# Patient Record
Sex: MALE | Race: WHITE | NOT HISPANIC OR LATINO | Employment: FULL TIME | URBAN - NONMETROPOLITAN AREA
[De-identification: names, ages, dates, MRNs, and addresses within clinical notes are randomized per-mention and may not be internally consistent; named-entity substitution may affect disease eponyms.]

---

## 2024-03-08 ENCOUNTER — OFFICE VISIT (OUTPATIENT)
Dept: FAMILY MEDICINE CLINIC | Facility: CLINIC | Age: 44
End: 2024-03-08
Payer: COMMERCIAL

## 2024-03-08 VITALS
BODY MASS INDEX: 24.99 KG/M2 | SYSTOLIC BLOOD PRESSURE: 120 MMHG | DIASTOLIC BLOOD PRESSURE: 80 MMHG | OXYGEN SATURATION: 99 % | HEART RATE: 87 BPM | WEIGHT: 164.9 LBS | HEIGHT: 68 IN

## 2024-03-08 DIAGNOSIS — Z11.59 NEED FOR HEPATITIS C SCREENING TEST: ICD-10-CM

## 2024-03-08 DIAGNOSIS — G89.29 CHRONIC RIGHT SHOULDER PAIN: Primary | ICD-10-CM

## 2024-03-08 DIAGNOSIS — Z13.1 SCREENING FOR DIABETES MELLITUS: ICD-10-CM

## 2024-03-08 DIAGNOSIS — Z13.220 LIPID SCREENING: ICD-10-CM

## 2024-03-08 DIAGNOSIS — Z13.29 SCREENING FOR THYROID DISORDER: ICD-10-CM

## 2024-03-08 DIAGNOSIS — M25.511 CHRONIC RIGHT SHOULDER PAIN: Primary | ICD-10-CM

## 2024-03-08 PROCEDURE — 36415 COLL VENOUS BLD VENIPUNCTURE: CPT | Performed by: PHYSICIAN ASSISTANT

## 2024-03-08 RX ORDER — PREDNISONE 10 MG/1
10 TABLET ORAL DAILY
Qty: 20 TABLET | Refills: 0 | Status: SHIPPED | OUTPATIENT
Start: 2024-03-08

## 2024-03-08 NOTE — PROGRESS NOTES
New Patient Office Visit      Date: 2024   Patient Name: Vin Aggarwal  : 1980   MRN: [id number]     Chief Complaint:    Chief Complaint   Patient presents with    Shoulder Pain       History of Present Illness: Vin Aggarwal is a 44 y.o. male who is here today to establish care.  He complains of R shoulder pain for a long time, which has been worse over the last 4 months.  He denies any known injury. He denies any numbness or tingling, but he admits radiation of pain to his elbow. He admits some weakness. He is R handed. He is restricted on internal and external rotation as well as abduction beyond shoulder height. He has been taking some Advil, which gives a little relief.     He has also not had a checkup in a while, so we will do some screening labs while he is here.    Subjective      Review of Systems:   Review of Systems   Musculoskeletal:  Positive for arthralgias. Negative for joint swelling and neck pain.       Past Medical History:   Past Medical History:   Diagnosis Date    ADHD (attention deficit hyperactivity disorder)     I have not been on meds since about  and would like to get back on them    HL (hearing loss) Long time ago    Constant ringing in my ears    Pseudotumor        Past Surgical History:   Past Surgical History:   Procedure Laterality Date    SKIN GRAFT Left     L arm, pseudoaneurysm    VASECTOMY  2006 maybe       Family History:   Family History   Problem Relation Age of Onset    Tongue cancer Mother         passed at 69    Diabetes Paternal Aunt        Social History:   Social History     Socioeconomic History    Marital status: Single   Tobacco Use    Smoking status: Former     Average packs/day: 1 pack/day for 15.0 years (15.0 ttl pk-yrs)     Types: Cigarettes     Start date:     Smokeless tobacco: Never    Tobacco comments:     I haven't smoked in 8 years   Vaping Use    Vaping status: Every Day    Substances: Nicotine   Substance and Sexual  "Activity    Alcohol use: Not Currently    Drug use: Not Currently     Types: Marijuana    Sexual activity: Yes     Partners: Female     Birth control/protection: None, Vasectomy       Medications:     Current Outpatient Medications:     predniSONE (DELTASONE) 10 MG tablet, Take 1 tablet by mouth Daily. 4 tab po qd x 2d, then 3 tab po qd x 2d, then 2 tab po qd x 2d, then 1 tab po qd x 2d, Disp: 20 tablet, Rfl: 0    Allergies:   No Known Allergies    Objective     Physical Exam:  Vital Signs:   Vitals:    03/08/24 1447   BP: 120/80   BP Location: Right arm   Patient Position: Sitting   Cuff Size: Adult   Pulse: 87   SpO2: 99%   Weight: 74.8 kg (164 lb 14.4 oz)   Height: 172.7 cm (68\")     Body mass index is 25.07 kg/m².       Physical Exam  Vitals and nursing note reviewed.   Constitutional:       General: He is not in acute distress.     Appearance: Normal appearance. He is not ill-appearing.   HENT:      Head: Normocephalic and atraumatic.   Cardiovascular:      Rate and Rhythm: Normal rate and regular rhythm.      Pulses: Normal pulses.      Heart sounds: Normal heart sounds.   Pulmonary:      Effort: Pulmonary effort is normal. No respiratory distress.      Breath sounds: Normal breath sounds.   Musculoskeletal:      Right shoulder: Tenderness present. No swelling or deformity. Decreased range of motion. Decreased strength. Normal pulse.      Left shoulder: Normal.      Right lower leg: No edema.      Left lower leg: No edema.   Skin:     General: Skin is warm and dry.   Neurological:      General: No focal deficit present.      Mental Status: He is alert and oriented to person, place, and time.      Motor: No weakness.      Coordination: Coordination normal.   Psychiatric:         Mood and Affect: Mood normal.         Behavior: Behavior normal.       Results:   PHQ-9 Total Score: 0        Assessment / Plan      Assessment/Plan:   Diagnoses and all orders for this visit:    1. Chronic right shoulder pain " (Primary)  Assessment & Plan:  We will try a round of steroids to see if his symptoms improve, and he will also complete an x-ray. I will send referral for him to see Ortho for further evaluation.  He is in agreement with this plan.    Orders:  -     XR Shoulder 2+ View Right; Future  -     Ambulatory Referral to Orthopedic Surgery  -     predniSONE (DELTASONE) 10 MG tablet; Take 1 tablet by mouth Daily. 4 tab po qd x 2d, then 3 tab po qd x 2d, then 2 tab po qd x 2d, then 1 tab po qd x 2d  Dispense: 20 tablet; Refill: 0  -     Comprehensive Metabolic Panel; Future  -     CBC Auto Differential; Future  -     Comprehensive Metabolic Panel  -     CBC Auto Differential    2. Screening for diabetes mellitus  -     Hemoglobin A1c; Future  -     Hemoglobin A1c    3. Lipid screening  -     Lipid Panel; Future  -     Lipid Panel    4. Need for hepatitis C screening test  -     HCV Antibody Rfx To Qnt PCR; Future  -     HCV Antibody Rfx To Qnt PCR  -     Interpretation:    5. Screening for thyroid disorder  -     Thyroid Cascade Profile; Future  -     Thyroid Cascade Profile  -     Reflexed Thyroxine (T4) Free, Direct, S  -     Thyroid Peroxidase (TPO) Ab         Follow Up:   Return for Annual Physical in 3-6 months.    Carisa Pollard PA-C  [facility]

## 2024-03-09 LAB
ALBUMIN SERPL-MCNC: 5.5 G/DL (ref 4.1–5.1)
ALBUMIN/GLOB SERPL: 2.1 {RATIO} (ref 1.2–2.2)
ALP SERPL-CCNC: 71 IU/L (ref 44–121)
ALT SERPL-CCNC: 20 IU/L (ref 0–44)
AST SERPL-CCNC: 23 IU/L (ref 0–40)
BASOPHILS # BLD AUTO: 0.1 X10E3/UL (ref 0–0.2)
BASOPHILS NFR BLD AUTO: 1 %
BILIRUB SERPL-MCNC: 0.6 MG/DL (ref 0–1.2)
BUN SERPL-MCNC: 14 MG/DL (ref 6–24)
BUN/CREAT SERPL: 17 (ref 9–20)
CALCIUM SERPL-MCNC: 10.1 MG/DL (ref 8.7–10.2)
CHLORIDE SERPL-SCNC: 102 MMOL/L (ref 96–106)
CHOLEST SERPL-MCNC: 210 MG/DL (ref 100–199)
CO2 SERPL-SCNC: 22 MMOL/L (ref 20–29)
CREAT SERPL-MCNC: 0.82 MG/DL (ref 0.76–1.27)
EGFRCR SERPLBLD CKD-EPI 2021: 112 ML/MIN/1.73
EOSINOPHIL # BLD AUTO: 0.1 X10E3/UL (ref 0–0.4)
EOSINOPHIL NFR BLD AUTO: 2 %
ERYTHROCYTE [DISTWIDTH] IN BLOOD BY AUTOMATED COUNT: 13.1 % (ref 11.6–15.4)
GLOBULIN SER CALC-MCNC: 2.6 G/DL (ref 1.5–4.5)
GLUCOSE SERPL-MCNC: 89 MG/DL (ref 70–99)
HBA1C MFR BLD: 5.4 % (ref 4.8–5.6)
HCT VFR BLD AUTO: 44.9 % (ref 37.5–51)
HCV AB SERPL QL IA: NORMAL
HCV IGG SERPL QL IA: NON REACTIVE
HDLC SERPL-MCNC: 62 MG/DL
HGB BLD-MCNC: 15.6 G/DL (ref 13–17.7)
IMM GRANULOCYTES # BLD AUTO: 0.1 X10E3/UL (ref 0–0.1)
IMM GRANULOCYTES NFR BLD AUTO: 1 %
INTERPRETATION: NORMAL
LDLC SERPL CALC-MCNC: 125 MG/DL (ref 0–99)
LYMPHOCYTES # BLD AUTO: 1.9 X10E3/UL (ref 0.7–3.1)
LYMPHOCYTES NFR BLD AUTO: 29 %
MCH RBC QN AUTO: 31.1 PG (ref 26.6–33)
MCHC RBC AUTO-ENTMCNC: 34.7 G/DL (ref 31.5–35.7)
MCV RBC AUTO: 90 FL (ref 79–97)
MONOCYTES # BLD AUTO: 0.5 X10E3/UL (ref 0.1–0.9)
MONOCYTES NFR BLD AUTO: 7 %
NEUTROPHILS # BLD AUTO: 3.9 X10E3/UL (ref 1.4–7)
NEUTROPHILS NFR BLD AUTO: 60 %
PLATELET # BLD AUTO: 341 X10E3/UL (ref 150–450)
POTASSIUM SERPL-SCNC: 4.1 MMOL/L (ref 3.5–5.2)
PROT SERPL-MCNC: 8.1 G/DL (ref 6–8.5)
RBC # BLD AUTO: 5.01 X10E6/UL (ref 4.14–5.8)
SODIUM SERPL-SCNC: 142 MMOL/L (ref 134–144)
T4 FREE SERPL-MCNC: 1.49 NG/DL (ref 0.82–1.77)
THYROPEROXIDASE AB SERPL-ACNC: 14 IU/ML (ref 0–34)
TRIGL SERPL-MCNC: 131 MG/DL (ref 0–149)
TSH SERPL DL<=0.005 MIU/L-ACNC: 5.26 UIU/ML (ref 0.45–4.5)
VLDLC SERPL CALC-MCNC: 23 MG/DL (ref 5–40)
WBC # BLD AUTO: 6.5 X10E3/UL (ref 3.4–10.8)

## 2024-03-24 PROBLEM — G89.29 CHRONIC RIGHT SHOULDER PAIN: Status: ACTIVE | Noted: 2024-03-24

## 2024-03-24 PROBLEM — M25.511 CHRONIC RIGHT SHOULDER PAIN: Status: ACTIVE | Noted: 2024-03-24

## 2024-03-25 NOTE — ASSESSMENT & PLAN NOTE
We will try a round of steroids to see if his symptoms improve, and he will also complete an x-ray. I will send referral for him to see Ortho for further evaluation.  He is in agreement with this plan.

## 2024-03-26 ENCOUNTER — OFFICE VISIT (OUTPATIENT)
Dept: ORTHOPEDIC SURGERY | Facility: CLINIC | Age: 44
End: 2024-03-26
Payer: COMMERCIAL

## 2024-03-26 VITALS
DIASTOLIC BLOOD PRESSURE: 78 MMHG | HEIGHT: 68 IN | WEIGHT: 164.9 LBS | BODY MASS INDEX: 24.99 KG/M2 | SYSTOLIC BLOOD PRESSURE: 122 MMHG

## 2024-03-26 DIAGNOSIS — G56.03 CARPAL TUNNEL SYNDROME, BILATERAL: ICD-10-CM

## 2024-03-26 DIAGNOSIS — M75.81 TENDINITIS OF RIGHT ROTATOR CUFF: Primary | ICD-10-CM

## 2024-03-26 RX ORDER — CELECOXIB 200 MG/1
CAPSULE ORAL
Qty: 60 CAPSULE | Refills: 1 | Status: SHIPPED | OUTPATIENT
Start: 2024-03-26

## 2024-03-26 NOTE — PROGRESS NOTES
[facility] Visit     Office Visit       Date: 03/26/2024   Patient Name: Vin Aggarwal  MRN: [id number]  YOB: 1980    Referring Physician: Carisa Pollard*     Chief Complaint:   Chief Complaint   Patient presents with    Right Shoulder - Pain     History of Present Illness: Vin Aggarwal is a 44 y.o. male who is here today for evaluation of right shoulder pain.  Symptoms ongoing for the last 15 years.  His pain is an 8/10 described as dull aching throbbing stabbing shooting.  He also has popping.  Symptoms primarily affect his ability to sleep work and move the shoulder.  He has been on oral steroids and ibuprofen.    Subjective   Review of Systems: Review of Systems   Constitutional:  Negative for chills, fever, unexpected weight gain and unexpected weight loss.   HENT:  Negative for congestion, postnasal drip and rhinorrhea.    Eyes:  Negative for blurred vision.   Respiratory:  Negative for shortness of breath.    Cardiovascular:  Negative for leg swelling.   Gastrointestinal:  Negative for abdominal pain, nausea and vomiting.   Genitourinary:  Negative for difficulty urinating.   Musculoskeletal:  Positive for arthralgias. Negative for gait problem, joint swelling and myalgias.   Skin:  Negative for skin lesions and wound.   Neurological:  Negative for dizziness, weakness, light-headedness and numbness.   Hematological:  Does not bruise/bleed easily.   Psychiatric/Behavioral:  Negative for depressed mood.         Past Medical History:   Past Medical History:   Diagnosis Date    ADHD (attention deficit hyperactivity disorder) 2014    I have not been on meds since about 2015 and would like to get back on them    HL (hearing loss) Long time ago    Constant ringing in my ears    Pseudotumor        Past Surgical History:   Past Surgical History:   Procedure Laterality Date    SKIN GRAFT Left 2014    L arm, pseudoaneurysm    VASECTOMY  " 2006 maybe       Family History:   Family History   Problem Relation Age of Onset    Tongue cancer Mother         passed at 69    Diabetes Paternal Aunt        Social History:   Social History     Socioeconomic History    Marital status: Single   Tobacco Use    Smoking status: Former     Average packs/day: 1 pack/day for 15.0 years (15.0 ttl pk-yrs)     Types: Cigarettes     Start date: 2000    Smokeless tobacco: Never    Tobacco comments:     I haven't smoked in 8 years   Vaping Use    Vaping status: Every Day    Substances: Nicotine   Substance and Sexual Activity    Alcohol use: Not Currently    Drug use: Not Currently     Types: Marijuana    Sexual activity: Yes     Partners: Female     Birth control/protection: None, Vasectomy       Medications:   Current Outpatient Medications:     predniSONE (DELTASONE) 10 MG tablet, Take 1 tablet by mouth Daily. 4 tab po qd x 2d, then 3 tab po qd x 2d, then 2 tab po qd x 2d, then 1 tab po qd x 2d, Disp: 20 tablet, Rfl: 0    celecoxib (CeleBREX) 200 MG capsule, Take 1 tablet orally 2 times per day with meals., Disp: 60 capsule, Rfl: 1    Allergies: No Known Allergies    I reviewed the patient's chief complaint, history of present illness, review of systems, past medical history, surgical history, family history, social history, medications and allergy list.     Objective    Vital Signs:   Vitals:    03/26/24 0828   BP: 122/78   Weight: 74.8 kg (164 lb 14.5 oz)   Height: 172.7 cm (67.99\")     Body mass index is 25.08 kg/m².   BMI is >= 25 and <30. (Overweight) The following options were offered after discussion;: exercise counseling/recommendations and nutrition counseling/recommendations     In this visit the patient was advised to stop smoking and was offered tobacco cessation measures and resources, including NRT and/or medication intervention. At least 5 minutes was spent on face-to-face counseling regarding smoking cessation.     Ortho Exam:  Constitutional: General " Appearance: healthy-appearing, NAD, and normal body habitus.   Psychiatric: Mood and Affect: normal mood and affect and active and alert.   Cardiovascular System: Arterial Pulses Right: radial normal. Arterial Pulses Left: radial normal.   C-Spine/Neck: Active Range of Motion: no crepitus or pain elicited on motion and flexion normal, extension normal, rotation normal, and lateral flexion normal.   Shoulders: Inspection Right: no misalignment, erythema, induration, swelling, or warmth and AC prominence normal. Inspection Left: no misalignment, erythema, induration, swelling, or warmth and AC prominence normal. Bony Palpation Right: tenderness of the acromioclavicular joint, the greater tuberosity, and the bicipital groove and no tenderness of the clavicle. Soft Tissue Palpation Right: tenderness of the supraspinatus, the infraspinatus, the glenohumeral joint region, and the lateral cuff insertion. Active Range of Motion Right: limited. Special Tests Right: Hawkin's test positive and empty can sign positive.   exam RIGHT shoulder: forward flexion approximately 140 degrees. Abduction 100 degrees. Internal rotation SI. Motor testing supraspinatus 4/5  exam LEFT shoulder: forward flexion approximately 140 degrees. Abduction 140 degrees. Internal rotation L1. Motor testing supraspinatus 5/5    Results Review:   Imaging Results (Last 24 Hours)       ** No results found for the last 24 hours. **        I personally reviewed and interpreted radiographs of the right shoulder from 3/11/2024.  No acute fracture or dislocation.  No significant degenerative change identified.    Procedures    Assessment / Plan    Assessment/Plan:   Diagnoses and all orders for this visit:    1. Tendinitis of right rotator cuff (Primary)    2. Carpal tunnel syndrome, bilateral    Other orders  -     celecoxib (CeleBREX) 200 MG capsule; Take 1 tablet orally 2 times per day with meals.  Dispense: 60 capsule; Refill: 1    Right shoulder pain ongoing  for the last 15 years.  He has extensively used his arms in the past for lifting and work purposes.  Localizes pain to the lateral aspect of the shoulder.  He has good range of motion in flexion but reduced in abduction.  Special testing of the rotator cuff elicits pain.  I do not detect a full-thickness tear.  Radiographs of the right shoulder do not show any acute or degenerative osseous abnormalities.  He does have numbness and tingling down his arm from carpal tunnel syndrome as well.  Was recently started on oral steroids.    Plan:  1.  Start Celebrex twice per day for pain control.  Can take Tylenol with this medication but should avoid other NSAIDs especially ibuprofen.  2.  Start home exercise program for the rotator cuff along with resistance bands.  3.  Hold off on additional steroids but will discuss subacromial injection at follow-up visit if not improved.  4.  Follow-up in 6 weeks.    Previous documentation reviewed: 3/8/2024-office visit-SANGEETHA Clark.    Previous imaging studies reviewed: 3/11/2024-radiographs of the right shoulder.      Previous laboratory results reviewed: 3/8/2024-hemoglobin A1c 5.4%.    Follow Up:   Return in about 6 weeks (around 5/7/2024) for Recheck.      Ronnie Roque MD  [facility]

## 2024-05-07 ENCOUNTER — OFFICE VISIT (OUTPATIENT)
Dept: ORTHOPEDIC SURGERY | Facility: CLINIC | Age: 44
End: 2024-05-07
Payer: COMMERCIAL

## 2024-05-07 VITALS — WEIGHT: 164.9 LBS | HEIGHT: 68 IN | BODY MASS INDEX: 24.99 KG/M2

## 2024-05-07 DIAGNOSIS — M75.81 TENDINITIS OF RIGHT ROTATOR CUFF: Primary | ICD-10-CM

## 2024-05-07 PROCEDURE — 99213 OFFICE O/P EST LOW 20 MIN: CPT | Performed by: STUDENT IN AN ORGANIZED HEALTH CARE EDUCATION/TRAINING PROGRAM

## 2024-05-07 PROCEDURE — 20610 DRAIN/INJ JOINT/BURSA W/O US: CPT | Performed by: STUDENT IN AN ORGANIZED HEALTH CARE EDUCATION/TRAINING PROGRAM

## 2024-05-07 RX ORDER — BUPIVACAINE HYDROCHLORIDE 2.5 MG/ML
4 INJECTION, SOLUTION EPIDURAL; INFILTRATION; INTRACAUDAL
Status: COMPLETED | OUTPATIENT
Start: 2024-05-07 | End: 2024-05-07

## 2024-05-07 RX ORDER — LIDOCAINE HYDROCHLORIDE 10 MG/ML
4 INJECTION, SOLUTION EPIDURAL; INFILTRATION; INTRACAUDAL; PERINEURAL
Status: COMPLETED | OUTPATIENT
Start: 2024-05-07 | End: 2024-05-07

## 2024-05-07 RX ORDER — TRIAMCINOLONE ACETONIDE 40 MG/ML
80 INJECTION, SUSPENSION INTRA-ARTICULAR; INTRAMUSCULAR
Status: COMPLETED | OUTPATIENT
Start: 2024-05-07 | End: 2024-05-07

## 2024-05-07 RX ADMIN — BUPIVACAINE HYDROCHLORIDE 4 ML: 2.5 INJECTION, SOLUTION EPIDURAL; INFILTRATION; INTRACAUDAL at 08:42

## 2024-05-07 RX ADMIN — TRIAMCINOLONE ACETONIDE 80 MG: 40 INJECTION, SUSPENSION INTRA-ARTICULAR; INTRAMUSCULAR at 08:42

## 2024-05-07 RX ADMIN — LIDOCAINE HYDROCHLORIDE 4 ML: 10 INJECTION, SOLUTION EPIDURAL; INFILTRATION; INTRACAUDAL; PERINEURAL at 08:42

## 2024-06-11 ENCOUNTER — OFFICE VISIT (OUTPATIENT)
Dept: FAMILY MEDICINE CLINIC | Facility: CLINIC | Age: 44
End: 2024-06-11
Payer: COMMERCIAL

## 2024-06-11 VITALS
DIASTOLIC BLOOD PRESSURE: 78 MMHG | BODY MASS INDEX: 25.46 KG/M2 | WEIGHT: 168 LBS | HEART RATE: 77 BPM | HEIGHT: 68 IN | OXYGEN SATURATION: 97 % | SYSTOLIC BLOOD PRESSURE: 118 MMHG | TEMPERATURE: 97.3 F

## 2024-06-11 DIAGNOSIS — R94.6 ABNORMAL FINDING ON THYROID FUNCTION TEST: ICD-10-CM

## 2024-06-11 DIAGNOSIS — Z23 NEED FOR VACCINATION: ICD-10-CM

## 2024-06-11 DIAGNOSIS — Z00.00 GENERAL MEDICAL EXAM: Primary | ICD-10-CM

## 2024-06-11 DIAGNOSIS — K21.9 GASTROESOPHAGEAL REFLUX DISEASE WITHOUT ESOPHAGITIS: ICD-10-CM

## 2024-06-11 PROCEDURE — 99396 PREV VISIT EST AGE 40-64: CPT | Performed by: PHYSICIAN ASSISTANT

## 2024-06-11 PROCEDURE — 90471 IMMUNIZATION ADMIN: CPT | Performed by: PHYSICIAN ASSISTANT

## 2024-06-11 PROCEDURE — 90715 TDAP VACCINE 7 YRS/> IM: CPT | Performed by: PHYSICIAN ASSISTANT

## 2024-06-11 PROCEDURE — 99213 OFFICE O/P EST LOW 20 MIN: CPT | Performed by: PHYSICIAN ASSISTANT

## 2024-06-11 PROCEDURE — 36415 COLL VENOUS BLD VENIPUNCTURE: CPT | Performed by: PHYSICIAN ASSISTANT

## 2024-06-11 RX ORDER — OMEPRAZOLE 40 MG/1
40 CAPSULE, DELAYED RELEASE ORAL DAILY
Qty: 30 CAPSULE | Refills: 2 | Status: SHIPPED | OUTPATIENT
Start: 2024-06-11

## 2024-06-11 NOTE — ASSESSMENT & PLAN NOTE
I encouraged not eating at least 2 to 3 hours before laying down.  I also recommend monitoring the foods that cause symptoms and sleeping with the head of the bed elevated.  I also recommend taking the Celebrex with food, but this could be a cause of the worsening GERD.

## 2024-06-11 NOTE — PATIENT INSTRUCTIONS
Local Options for Counseling:  [facility]- [phone]  [facility]- [place] [facility]- [phone]  [facility]- [phone]  [facility]- [phone]

## 2024-06-11 NOTE — PROGRESS NOTES
Male Physical Note      Date: 2024   Patient Name: Vin Aggarwal  : 1980   MRN: [id number]     Chief Complaint:    Chief Complaint   Patient presents with    Employment Physical     Was emplyment now regular physical       History of Present Illness: Vin Aggarwal is a 44 y.o. male who is here today for their annual health maintenance and physical.  He denies any acute complaints at this time.  He states that he has been doing better with his shoulder pain since he got the injection, and he is scheduled to follow-up with Ortho next week.    He admits that he has had some problems with heartburn frequently.  He has been taking some over-the-counter omeprazole, and it helps usually.  He denies any changes in his diet, and he states that he does not eat at least a couple of hours before he lays down at night.    Subjective      Review of Systems:   Review of Systems   Constitutional:  Negative for activity change, appetite change, fatigue, fever, unexpected weight gain and unexpected weight loss.   HENT:  Negative for congestion, ear discharge, ear pain, postnasal drip, rhinorrhea, sinus pressure, sneezing, sore throat, trouble swallowing and voice change.    Eyes:  Negative for blurred vision, double vision, photophobia, pain, itching and visual disturbance.   Respiratory:  Negative for apnea, cough, chest tightness, shortness of breath and wheezing.    Cardiovascular:  Negative for chest pain, palpitations and leg swelling.   Gastrointestinal:  Positive for GERD. Negative for abdominal pain, blood in stool, constipation, diarrhea, nausea and vomiting.   Endocrine: Negative for cold intolerance, heat intolerance, polydipsia and polyuria.   Genitourinary:  Negative for decreased urine volume, difficulty urinating, dysuria, flank pain, frequency, hematuria, urgency and urinary incontinence.   Musculoskeletal:  Negative for arthralgias, back pain, gait problem, joint swelling and myalgias.   Skin:   "Negative for rash, skin lesions and wound.   Allergic/Immunologic: Negative for environmental allergies and food allergies.   Neurological:  Negative for dizziness, syncope, weakness, light-headedness, numbness and headache.   Hematological:  Negative for adenopathy. Does not bruise/bleed easily.   Psychiatric/Behavioral:  Negative for decreased concentration, dysphoric mood, sleep disturbance, depressed mood and stress. The patient is not nervous/anxious.        Past Medical History, Social History, Family History and Care Team were all reviewed with patient and updated as appropriate.     Medications:     Current Outpatient Medications:     celecoxib (CeleBREX) 200 MG capsule, Take 1 tablet orally 2 times per day with meals., Disp: 60 capsule, Rfl: 1    omeprazole (priLOSEC) 40 MG capsule, Take 1 capsule by mouth Daily., Disp: 30 capsule, Rfl: 2    Allergies:   No Known Allergies    Health Maintenance   Topic Date Due    ANNUAL PHYSICAL  Never done    COVID-19 Vaccine (1 - 2023-24 season) 08/31/2024 (Originally 9/1/2023)    INFLUENZA VACCINE  08/01/2024    BMI FOLLOWUP  03/26/2025    TDAP/TD VACCINES (2 - Td or Tdap) 06/11/2034    HEPATITIS C SCREENING  Completed    Pneumococcal Vaccine 0-64  Aged Out        Diet/Physical activity:  He states that his diet is high in meat, but he does eat some vegetables.  He does not eat many fruits.    He is not currently doing any formal exercise.     Sexual health: Denies concerns.    Objective     Physical Exam:  Vital Signs:   Vitals:    06/11/24 0808   BP: 118/78   BP Location: Right arm   Patient Position: Sitting   Pulse: 77   Temp: 97.3 °F (36.3 °C)   TempSrc: Infrared   SpO2: 97%   Weight: 76.2 kg (168 lb)   Height: 172.7 cm (67.99\")   PainSc: 0-No pain     Body mass index is 25.55 kg/m².     Physical Exam  Vitals and nursing note reviewed.   Constitutional:       Appearance: Normal appearance.   HENT:      Head: Normocephalic and atraumatic.      Right Ear: Tympanic " membrane, ear canal and external ear normal.      Left Ear: Tympanic membrane, ear canal and external ear normal.      Nose: Nose normal.      Mouth/Throat:      Mouth: Mucous membranes are moist.      Pharynx: Oropharynx is clear.   Eyes:      Extraocular Movements: Extraocular movements intact.      Conjunctiva/sclera: Conjunctivae normal.      Pupils: Pupils are equal, round, and reactive to light.   Cardiovascular:      Rate and Rhythm: Normal rate and regular rhythm.      Heart sounds: Normal heart sounds.   Pulmonary:      Effort: Pulmonary effort is normal.      Breath sounds: Normal breath sounds.   Abdominal:      General: Bowel sounds are normal.      Palpations: Abdomen is soft.   Musculoskeletal:      Cervical back: Normal range of motion and neck supple.      Right lower leg: No edema.      Left lower leg: No edema.   Skin:     General: Skin is warm.   Neurological:      General: No focal deficit present.      Mental Status: He is alert.   Psychiatric:         Mood and Affect: Mood normal.         Behavior: Behavior normal.       Assessment / Plan      Assessment/Plan:   Diagnoses and all orders for this visit:    1. General medical exam (Primary)  Comments:  Benefits of immunizations and preventative screenings discussed with the patient and encouraged.    2. Gastroesophageal reflux disease without esophagitis  Assessment & Plan:  I encouraged not eating at least 2 to 3 hours before laying down.  I also recommend monitoring the foods that cause symptoms and sleeping with the head of the bed elevated.  I also recommend taking the Celebrex with food, but this could be a cause of the worsening GERD.    Orders:  -     omeprazole (priLOSEC) 40 MG capsule; Take 1 capsule by mouth Daily.  Dispense: 30 capsule; Refill: 2    3. Abnormal finding on thyroid function test  Assessment & Plan:  Thyroid level was abnormal on last labs, so we will repeat level today.    Orders:  -     TSH; Future  -     T4, Free;  Future  -     TSH  -     T4, Free    4. Need for vaccination  -     Tdap Vaccine Greater Than or Equal To 8yo IM        Follow Up:   Return in about 1 year (around 6/11/2025) for annual physical when due or sooner PRN.    Healthcare Maintenance:   Discussed injury prevention, diet and exercise, safe sexual practices, and screening for common diseases. Encouraged use of sunscreen and seatbelts. Discussed timing of colon cancer cancer screening, prostate cancer screening, and review of skin for lesions. Avoidance of tobacco encouraged. Limitation or avoidance of alcohol encouraged. Recommend yearly dental and eye exams. Also discussed monitoring of blood pressure and lipids.   Vin Aggarwal voices understanding and acceptance of this advice and will call back with any further questions or concerns. AVS with preventive healthcare tips printed for patient.     Carisa Pollard PA-C  [facility]

## 2024-06-12 LAB
T4 FREE SERPL-MCNC: 1.42 NG/DL (ref 0.82–1.77)
TSH SERPL DL<=0.005 MIU/L-ACNC: 3.98 UIU/ML (ref 0.45–4.5)

## 2024-06-14 RX ORDER — CELECOXIB 200 MG/1
CAPSULE ORAL
Qty: 60 CAPSULE | Refills: 1 | Status: SHIPPED | OUTPATIENT
Start: 2024-06-14

## 2024-06-18 ENCOUNTER — OFFICE VISIT (OUTPATIENT)
Dept: ORTHOPEDIC SURGERY | Facility: CLINIC | Age: 44
End: 2024-06-18
Payer: COMMERCIAL

## 2024-06-18 VITALS
HEIGHT: 68 IN | SYSTOLIC BLOOD PRESSURE: 120 MMHG | WEIGHT: 168 LBS | BODY MASS INDEX: 25.46 KG/M2 | DIASTOLIC BLOOD PRESSURE: 74 MMHG

## 2024-06-18 DIAGNOSIS — M75.81 TENDINITIS OF RIGHT ROTATOR CUFF: Primary | ICD-10-CM

## 2024-06-18 PROCEDURE — 99213 OFFICE O/P EST LOW 20 MIN: CPT | Performed by: STUDENT IN AN ORGANIZED HEALTH CARE EDUCATION/TRAINING PROGRAM

## 2024-06-18 NOTE — PROGRESS NOTES
[facility] Follow Up Visit     Office Follow Up      Date: 06/18/2024   Patient Name: Vin Aggarwal  MRN: [id number]  YOB: 1980    Referring Physician: No ref. provider found     Chief Complaint:   Chief Complaint   Patient presents with    Follow-up     6 week follow up --Tendinitis of right rotator cuff       History of Present Illness: Vin Aggarwal is a 44 y.o. male who returns to clinic today for follow up on right shoulder pain. His pain is a 3 /10 on the pain scale. Patient has tried the following previous treatments anti-inflammatories, physical therapy , and oral steroids. he mentions current symptoms of same as prior . He states that these treatments have Improved.      Subjective     Review of Systems: Review of Systems   Constitutional:  Negative for chills, fever, unexpected weight gain and unexpected weight loss.   HENT:  Negative for congestion, postnasal drip and rhinorrhea.    Eyes:  Negative for blurred vision.   Respiratory:  Negative for shortness of breath.    Cardiovascular:  Negative for leg swelling.   Gastrointestinal:  Negative for abdominal pain, nausea and vomiting.   Genitourinary:  Negative for difficulty urinating.   Musculoskeletal:  Positive for arthralgias. Negative for gait problem, joint swelling and myalgias.   Skin:  Negative for skin lesions and wound.   Neurological:  Negative for dizziness, weakness, light-headedness and numbness.   Hematological:  Does not bruise/bleed easily.   Psychiatric/Behavioral:  Negative for depressed mood.         Medications:   Current Outpatient Medications:     celecoxib (CeleBREX) 200 MG capsule, TAKE 1 CAPSULE BY MOUTH TWICE A DAY WITH A MEAL, Disp: 60 capsule, Rfl: 1    omeprazole (priLOSEC) 40 MG capsule, Take 1 capsule by mouth Daily., Disp: 30 capsule, Rfl: 2    Allergies: No Known Allergies    I have reviewed and updated the patient's chief complaint, history of  "present illness, review of systems, past medical history, surgical history, family history, social history, medications and allergy list as appropriate.     Objective      Vital Signs:   Vitals:    06/18/24 0905   BP: 120/74   Weight: 76.2 kg (168 lb)   Height: 172.7 cm (68\")     Body mass index is 25.54 kg/m².  BMI is >= 25 and <30. (Overweight) The following options were offered after discussion;: exercise counseling/recommendations and nutrition counseling/recommendations     In this visit the patient was advised to stop smoking and was offered tobacco cessation measures and resources, including NRT and/or medication intervention. At least 3 minutes was spent on face-to-face counseling regarding smoking cessation.    Ortho Exam:  Exam right shoulder: forward flexion approximately 140 degrees. Abduction 140 degrees. Internal rotation L2. Motor testing supraspinatus 5/5.  Negative Neer's and Ahn test.    Results Review:   Imaging Results (Last 24 Hours)       ** No results found for the last 24 hours. **            Procedures    Assessment / Plan      Assessment/Plan:   Diagnoses and all orders for this visit:    1. Tendinitis of right rotator cuff (Primary)    Follow-up right rotator cuff tendinitis.  Symptoms greatly improved with Celebrex, home exercise program, and subacromial corticosteroid injection at last visit.  He now has full range of motion and no significant pain with special testing of the rotator cuff.  I recently refilled his Celebrex and have encouraged him to use it only as needed for pain control.  His home exercise program will be the mainstay for his treatment moving forward in an effort to keep his symptoms at bay.  If symptoms flare, he should return to the office for additional management of otherwise, follow-up as needed.    Follow Up:   Return if symptoms worsen or fail to improve.      Ronnie Roque MD  [facility]  "

## 2024-08-20 ENCOUNTER — OFFICE VISIT (OUTPATIENT)
Dept: FAMILY MEDICINE CLINIC | Facility: CLINIC | Age: 44
End: 2024-08-20
Payer: COMMERCIAL

## 2024-08-20 VITALS
SYSTOLIC BLOOD PRESSURE: 122 MMHG | HEIGHT: 68 IN | DIASTOLIC BLOOD PRESSURE: 84 MMHG | BODY MASS INDEX: 25.16 KG/M2 | OXYGEN SATURATION: 97 % | WEIGHT: 166 LBS | HEART RATE: 76 BPM

## 2024-08-20 DIAGNOSIS — B34.9 NONSPECIFIC SYNDROME SUGGESTIVE OF VIRAL ILLNESS: Primary | ICD-10-CM

## 2024-08-20 LAB
EXPIRATION DATE: NORMAL
FLUAV AG UPPER RESP QL IA.RAPID: NOT DETECTED
FLUBV AG UPPER RESP QL IA.RAPID: NOT DETECTED
INTERNAL CONTROL: NORMAL
Lab: NORMAL
SARS-COV-2 AG UPPER RESP QL IA.RAPID: NOT DETECTED

## 2024-08-20 PROCEDURE — 87428 SARSCOV & INF VIR A&B AG IA: CPT | Performed by: FAMILY MEDICINE

## 2024-08-20 PROCEDURE — 99213 OFFICE O/P EST LOW 20 MIN: CPT | Performed by: FAMILY MEDICINE

## 2024-08-20 NOTE — PROGRESS NOTES
"     Follow Up Office Visit      Patient Name: Vin Aggarwal  : 1980   MRN: [id number]     Chief Complaint:    Chief Complaint   Patient presents with    Generalized Body Aches     Patient also complains of headache x 3 days     Fatigue       History of Present Illness: Vin Aggarwal is a 44 y.o. male who is here today for follow up with 3 days of headache, fatigue, body aches.  He denies sore throat, cough, fever.  No rashes, no tick bites no abdominal pain.    Subjective      Review of Systems:   Review of Systems   Constitutional:  Positive for fatigue. Negative for fever.   HENT:  Negative for congestion, ear pain, sore throat and swollen glands.    Respiratory:  Negative for cough and shortness of breath.    Gastrointestinal:  Negative for abdominal pain, diarrhea, nausea and vomiting.   Skin:  Negative for rash and wound.       The following portions of the patient's history were reviewed and updated as appropriate: allergies, current medications, past family history, past medical history, past social history, past surgical history and problem list.    Medications:     Current Outpatient Medications:     celecoxib (CeleBREX) 200 MG capsule, TAKE 1 CAPSULE BY MOUTH TWICE A DAY WITH A MEAL, Disp: 60 capsule, Rfl: 1    omeprazole (priLOSEC) 40 MG capsule, Take 1 capsule by mouth Daily., Disp: 30 capsule, Rfl: 2    Allergies:   No Known Allergies    Objective     Physical Exam:  Vital Signs:   Vitals:    24 1313   BP: 122/84   BP Location: Right arm   Patient Position: Sitting   Cuff Size: Adult   Pulse: 76   SpO2: 97%   Weight: 75.3 kg (166 lb)   Height: 172.7 cm (68\")     Body mass index is 25.24 kg/m².   Facility age limit for growth %triny is 20 years.    Physical Exam  Vitals and nursing note reviewed.   Constitutional:       General: He is not in acute distress.     Appearance: Normal appearance. He is not ill-appearing or toxic-appearing.   HENT:      Head: Normocephalic and atraumatic.      " Right Ear: Tympanic membrane normal.      Left Ear: Tympanic membrane normal.      Mouth/Throat:      Mouth: Mucous membranes are moist.      Pharynx: Oropharynx is clear.   Cardiovascular:      Rate and Rhythm: Normal rate and regular rhythm.   Pulmonary:      Effort: Pulmonary effort is normal.      Breath sounds: Normal breath sounds.   Skin:     Findings: No lesion or rash.   Neurological:      General: No focal deficit present.      Mental Status: He is alert.   Psychiatric:         Mood and Affect: Mood normal.         Procedures    PHQ-9 Total Score:       Assessment / Plan      Assessment/Plan:   Assessment & Plan  Nonspecific syndrome suggestive of viral illness                 Likely viral illness.  I recommend symptomatic treatment.  Call or schedule a return to clinic if symptoms persist or worsen.      Follow Up:   No follow-ups on file.      JHONY Harmon MD  [facility] Bowman

## 2025-01-10 ENCOUNTER — TELEPHONE (OUTPATIENT)
Dept: FAMILY MEDICINE CLINIC | Facility: CLINIC | Age: 45
End: 2025-01-10

## 2025-01-10 NOTE — TELEPHONE ENCOUNTER
CALLED PATIENT AND LEFT A VOICEMAIL TO INFORM PATIENT TODAY'S APPT WAS SWITCHED TO A MYCHART VIDEO VISIT TOLD PATIENT TO CALL BACK TO CONFIRM

## 2025-06-10 ENCOUNTER — TELEPHONE (OUTPATIENT)
Dept: FAMILY MEDICINE CLINIC | Facility: CLINIC | Age: 45
End: 2025-06-10
Payer: COMMERCIAL

## 2025-06-10 NOTE — TELEPHONE ENCOUNTER
HAD TO MOVE PATIENT PER PROVIDER. CALLED HIM AND LEFT A VOICEMAIL TO SEE IF THE DAY I MOVED HIM TO WORKS AND TOLD HIM TO CALL BACK TO CONFIRM

## 2025-06-12 ENCOUNTER — OFFICE VISIT (OUTPATIENT)
Dept: FAMILY MEDICINE CLINIC | Facility: CLINIC | Age: 45
End: 2025-06-12
Payer: COMMERCIAL

## 2025-06-12 VITALS
WEIGHT: 156.7 LBS | DIASTOLIC BLOOD PRESSURE: 80 MMHG | SYSTOLIC BLOOD PRESSURE: 126 MMHG | BODY MASS INDEX: 23.75 KG/M2 | HEART RATE: 86 BPM | HEIGHT: 68 IN | OXYGEN SATURATION: 98 %

## 2025-06-12 DIAGNOSIS — S41.132D: Primary | ICD-10-CM

## 2025-06-12 DIAGNOSIS — Z48.89 SUTURE CHECK: ICD-10-CM

## 2025-06-12 PROBLEM — S41.132A: Status: ACTIVE | Noted: 2025-06-12

## 2025-06-12 NOTE — PROGRESS NOTES
"Chief Complaint  Hospital [place] [place] d/t pt stabbing himself at work in bicep. Pt states his hand is swollen and arm ia hurting and needs work excuse for today and tomorrow.)    Subjective        Vin Aggarwal presents to [facility]  History of Present Illness    History of Present Illness  The patient is a 45-year-old male presenting for evaluation of a biceps injury.    He sustained a self-inflicted injury to his left biceps with a metal razor knife at work on Monday, necessitating an immediate ER visit at [facility]. 7 Stitches were applied. He reports persistent but mild finger swelling, increased tightness to fingers, and consistent soreness to arm with movement. Advised to wait 10 days before suture removal.        Objective   Vital Signs:  /80 (BP Location: Right arm, Patient Position: Sitting, Cuff Size: Adult)   Pulse 86   Ht 172.7 cm (68\")   Wt 71.1 kg (156 lb 11.2 oz)   SpO2 98%   BMI 23.83 kg/m²   Estimated body mass index is 23.83 kg/m² as calculated from the following:    Height as of this encounter: 172.7 cm (68\").    Weight as of this encounter: 71.1 kg (156 lb 11.2 oz).    BMI is within normal parameters. No other follow-up for BMI required.      Physical Exam  Vitals and nursing note reviewed.   Constitutional:       General: He is not in acute distress.     Appearance: Normal appearance.   HENT:      Head: Normocephalic.      Right Ear: Hearing normal.      Left Ear: Hearing normal.   Eyes:      Pupils: Pupils are equal, round, and reactive to light.   Cardiovascular:      Rate and Rhythm: Normal rate and regular rhythm.   Pulmonary:      Effort: Pulmonary effort is normal. No respiratory distress.   Skin:     General: Skin is warm and dry.             Comments: 7 sutures in tact, no surrounding erythema or edema, non-tender to touch   Neurological:      Mental Status: He is alert.   Psychiatric:         Mood and " Affect: Mood normal.        Result Review :                Assessment and Plan   Diagnoses and all orders for this visit:    1. Puncture wound of left upper arm, subsequent encounter (Primary)    2. Suture check             Assessment & Plan  1. Puncture wound.  - Up to date with tetanus shot, received 2024.  - Injury healing well, sutures intact.  - Advised to elevate hand and apply ice PRN.  - Recommend Tylenol for pain.  -Joint stiffness in hands is chronic I suspect it is not exacerbated by recent injury however advised patient to contact Ortho for follow up appointment, he has seen Dr Roque in the past.   - Suture removal scheduled next week.  - Work note available upon request at .    2.  Suture check  Sutures are intact, continue current treatment plan.  Discussed signs of worsening symptoms advised ER should they occur.      Follow Up   Return in about 1 week (around 6/19/2025) for Recheck and Suture removal with PCP.  Patient was given instructions and counseling regarding his condition or for health maintenance advice. Please see specific information pulled into the AVS if appropriate.     Patient or patient representative verbalized consent for the use of Ambient Listening during the visit with  Dori Turner PA-C for chart documentation. 6/12/2025  14:54 EDT

## 2025-06-20 ENCOUNTER — OFFICE VISIT (OUTPATIENT)
Dept: FAMILY MEDICINE CLINIC | Facility: CLINIC | Age: 45
End: 2025-06-20
Payer: OTHER MISCELLANEOUS

## 2025-06-20 VITALS
HEIGHT: 68 IN | DIASTOLIC BLOOD PRESSURE: 82 MMHG | WEIGHT: 155 LBS | BODY MASS INDEX: 23.49 KG/M2 | HEART RATE: 82 BPM | OXYGEN SATURATION: 98 % | SYSTOLIC BLOOD PRESSURE: 114 MMHG

## 2025-06-20 DIAGNOSIS — S41.132D: Primary | ICD-10-CM

## 2025-06-20 DIAGNOSIS — K21.9 GASTROESOPHAGEAL REFLUX DISEASE WITHOUT ESOPHAGITIS: ICD-10-CM

## 2025-06-20 RX ORDER — MUPIROCIN 20 MG/G
1 OINTMENT TOPICAL 3 TIMES DAILY
Qty: 30 G | Refills: 0 | Status: SHIPPED | OUTPATIENT
Start: 2025-06-20

## 2025-06-20 RX ORDER — CELECOXIB 200 MG/1
CAPSULE ORAL
Qty: 60 CAPSULE | Refills: 1 | Status: SHIPPED | OUTPATIENT
Start: 2025-06-20

## 2025-06-20 RX ORDER — CELECOXIB 200 MG/1
CAPSULE ORAL
Qty: 60 CAPSULE | Refills: 1 | Status: CANCELLED | OUTPATIENT
Start: 2025-06-20

## 2025-06-20 RX ORDER — OMEPRAZOLE 40 MG/1
40 CAPSULE, DELAYED RELEASE ORAL DAILY
Qty: 30 CAPSULE | Refills: 2 | Status: CANCELLED | OUTPATIENT
Start: 2025-06-20

## 2025-06-20 RX ORDER — OMEPRAZOLE 40 MG/1
40 CAPSULE, DELAYED RELEASE ORAL DAILY
Qty: 30 CAPSULE | Refills: 2 | Status: SHIPPED | OUTPATIENT
Start: 2025-06-20

## 2025-06-20 NOTE — PROGRESS NOTES
Office Note     Name: Vin Aggarwal    : 1980     MRN: [id number]     Chief Complaint  Puncture Wound (Follow up)    Subjective     Vin Aggarwal is a 45 y.o. male here for eval of puncture wound of his left distal bicep.  He went to the ER and had 2 layers of sutures.  He states that it does feel better, but the muscle is still little tender.  He has not had any discharge from the lesion, and he is up-to-date on his tetanus shot.  This is day 11 of his sutures.      Review of Systems:   Review of Systems   All other systems reviewed and are negative.      Past Medical History:   Past Medical History:   Diagnosis Date    ADHD (attention deficit hyperactivity disorder)     I have not been on meds since about  and would like to get back on them    CTS (carpal tunnel syndrome)     Hands    HL (hearing loss) Long time ago    Constant ringing in my ears    Periarthritis of shoulder     Pseudotumor     Rotator cuff syndrome     It hurts all rhe time       Past Surgical History:   Past Surgical History:   Procedure Laterality Date    SKIN GRAFT Left     L arm, pseudoaneurysm    TRIGGER POINT INJECTION      VASECTOMY  2006 maybe       Family History:   Family History   Problem Relation Age of Onset    Tongue cancer Mother         passed at 69    Diabetes Paternal Aunt        Social History:   Social History     Socioeconomic History    Marital status: Single   Tobacco Use    Smoking status: Former     Current packs/day: 0.00     Average packs/day: 1.5 packs/day for 20.0 years (30.0 ttl pk-yrs)     Types: Cigarettes     Start date: 1994     Quit date: 2014     Years since quittin.4    Smokeless tobacco: Never    Tobacco comments:     I haven't smoked in 8 years   Vaping Use    Vaping status: Every Day    Substances: Nicotine    Devices: Disposable   Substance and Sexual Activity    Alcohol use: Not Currently    Drug use: Not Currently     Types: Marijuana    Sexual activity: Yes      "Partners: Female     Birth control/protection: None, Vasectomy       Immunizations:   Immunization History   Administered Date(s) Administered    Tdap 06/11/2024        Medications:     Current Outpatient Medications:     celecoxib (CeleBREX) 200 MG capsule, TAKE 1 CAPSULE BY MOUTH TWICE A DAY WITH A MEAL, Disp: 60 capsule, Rfl: 1    omeprazole (priLOSEC) 40 MG capsule, Take 1 capsule by mouth Daily., Disp: 30 capsule, Rfl: 2    mupirocin (BACTROBAN) 2 % ointment, Apply 1 Application topically to the appropriate area as directed 3 (Three) Times a Day., Disp: 30 g, Rfl: 0    Allergies:   No Known Allergies    Objective     Vital Signs  /82   Pulse 82   Ht 172.7 cm (68\")   Wt 70.3 kg (155 lb)   SpO2 98%   BMI 23.57 kg/m²   Estimated body mass index is 23.57 kg/m² as calculated from the following:    Height as of this encounter: 172.7 cm (68\").    Weight as of this encounter: 70.3 kg (155 lb).    BMI is within normal parameters. No other follow-up for BMI required.      Physical Exam  Vitals and nursing note reviewed.   Constitutional:       General: He is not in acute distress.     Appearance: Normal appearance. He is not ill-appearing.   HENT:      Head: Normocephalic and atraumatic.   Cardiovascular:      Rate and Rhythm: Normal rate and regular rhythm.      Pulses: Normal pulses.   Pulmonary:      Effort: Pulmonary effort is normal. No respiratory distress.   Musculoskeletal:      Right lower leg: No edema.      Left lower leg: No edema.   Skin:     General: Skin is warm and dry.          Neurological:      General: No focal deficit present.      Mental Status: He is alert and oriented to person, place, and time.      Motor: No weakness.      Coordination: Coordination normal.   Psychiatric:         Mood and Affect: Mood normal.         Behavior: Behavior normal.            Results:  No results found for this or any previous visit (from the past 24 hours).       Assessment and Plan       Diagnoses and " all orders for this visit:    1. Puncture wound of left upper arm, subsequent encounter (Primary)  Assessment & Plan:  Wound appears to be healing well with no discharge.  He does have mild erythema, so I will will give him some mupirocin ointment to use.  I recommend that he monitor closely and return if there is any swelling or discharge.  Sutures are removed and Steri-Strips placed for reinforcement.  I advised that he continue to avoid lifting and strenuous activity with his left arm.    Orders:  -     mupirocin (BACTROBAN) 2 % ointment; Apply 1 Application topically to the appropriate area as directed 3 (Three) Times a Day.  Dispense: 30 g; Refill: 0        Follow Up  Return for Annual Physical.    Carisa Pollard PA-C  [facility]

## 2025-06-20 NOTE — ASSESSMENT & PLAN NOTE
Wound appears to be healing well with no discharge.  He does have mild erythema, so I will will give him some mupirocin ointment to use.  I recommend that he monitor closely and return if there is any swelling or discharge.  Sutures are removed and Steri-Strips placed for reinforcement.  I advised that he continue to avoid lifting and strenuous activity with his left arm.

## 2025-07-30 ENCOUNTER — TELEPHONE (OUTPATIENT)
Dept: FAMILY MEDICINE CLINIC | Facility: CLINIC | Age: 45
End: 2025-07-30
Payer: COMMERCIAL

## 2025-07-30 NOTE — TELEPHONE ENCOUNTER
PHYSICAL SCHEDULED WITH SONYA ADAN DUE TO GEM NOT HAVING ANYTHING LEFT FOR THIS YEAR AND PATIENT NEEDING TO MOVE APPOINTMENT